# Patient Record
Sex: FEMALE | Race: WHITE | NOT HISPANIC OR LATINO
[De-identification: names, ages, dates, MRNs, and addresses within clinical notes are randomized per-mention and may not be internally consistent; named-entity substitution may affect disease eponyms.]

---

## 2017-01-12 ENCOUNTER — APPOINTMENT (OUTPATIENT)
Dept: BARIATRICS | Facility: CLINIC | Age: 32
End: 2017-01-12

## 2017-01-12 VITALS
TEMPERATURE: 98 F | HEIGHT: 65.5 IN | WEIGHT: 168 LBS | BODY MASS INDEX: 27.65 KG/M2 | HEART RATE: 77 BPM | SYSTOLIC BLOOD PRESSURE: 127 MMHG | DIASTOLIC BLOOD PRESSURE: 82 MMHG

## 2017-04-20 ENCOUNTER — APPOINTMENT (OUTPATIENT)
Dept: BARIATRICS | Facility: CLINIC | Age: 32
End: 2017-04-20

## 2017-04-20 VITALS
HEART RATE: 69 BPM | HEIGHT: 65.5 IN | TEMPERATURE: 97.5 F | OXYGEN SATURATION: 98 % | BODY MASS INDEX: 23.95 KG/M2 | SYSTOLIC BLOOD PRESSURE: 112 MMHG | DIASTOLIC BLOOD PRESSURE: 75 MMHG | WEIGHT: 145.5 LBS

## 2017-07-13 ENCOUNTER — APPOINTMENT (OUTPATIENT)
Dept: BARIATRICS | Facility: CLINIC | Age: 32
End: 2017-07-13

## 2017-07-13 VITALS
DIASTOLIC BLOOD PRESSURE: 71 MMHG | BODY MASS INDEX: 22.88 KG/M2 | WEIGHT: 139 LBS | HEIGHT: 65.5 IN | HEART RATE: 70 BPM | SYSTOLIC BLOOD PRESSURE: 115 MMHG

## 2017-11-16 ENCOUNTER — APPOINTMENT (OUTPATIENT)
Dept: BARIATRICS | Facility: CLINIC | Age: 32
End: 2017-11-16

## 2018-04-26 ENCOUNTER — MESSAGE (OUTPATIENT)
Age: 33
End: 2018-04-26

## 2019-08-14 ENCOUNTER — APPOINTMENT (OUTPATIENT)
Dept: BARIATRICS | Facility: CLINIC | Age: 34
End: 2019-08-14
Payer: SELF-PAY

## 2019-08-14 VITALS
OXYGEN SATURATION: 98 % | DIASTOLIC BLOOD PRESSURE: 80 MMHG | SYSTOLIC BLOOD PRESSURE: 115 MMHG | HEART RATE: 79 BPM | HEIGHT: 65.5 IN | BODY MASS INDEX: 24.9 KG/M2 | WEIGHT: 151.25 LBS

## 2019-08-14 DIAGNOSIS — Z98.84 BARIATRIC SURGERY STATUS: ICD-10-CM

## 2019-08-14 DIAGNOSIS — E66.01 MORBID (SEVERE) OBESITY DUE TO EXCESS CALORIES: ICD-10-CM

## 2019-08-14 DIAGNOSIS — K91.2 POSTSURGICAL MALABSORPTION, NOT ELSEWHERE CLASSIFIED: ICD-10-CM

## 2019-08-14 PROCEDURE — 99213 OFFICE O/P EST LOW 20 MIN: CPT

## 2019-09-24 NOTE — PHYSICAL EXAM
[Obese, well nourished, in no acute distress] : obese, well nourished, in no acute distress [Normal] : affect appropriate [de-identified] : mmm, good color

## 2019-09-24 NOTE — HISTORY OF PRESENT ILLNESS
[de-identified] : 35 yo female about 3 yrs s/p lap vsg, c/o "hypoglycemia,"  states gets palpitations and sweats occasionally.  not related to food intake,. sometimes in the middle of night.  states has been happening since surgery.  not worsening.  she is currently breastfeeding her 10 mo old.  typical day: bread- egg , veggies (lettuce raul, tomatoes)\par lunch- protein veggies, bagel, oil and salt\par snack-nuts, pretzels\par dinner- small portions chicken rice, soup, \par not exercising.  moves bowels daily soft.  not taking vitamins. \par denies vomit, numbness, tingling, weakness, heartburn, abd pain, cp, sob\par \par

## 2019-09-24 NOTE — PLAN
[FreeTextEntry1] : labs asap\par nutrition\par small frequent meals with more protein and fiber, counseled on avoiding simple carbs

## 2019-09-24 NOTE — ASSESSMENT
[FreeTextEntry1] : 35 yo female presents about 3 yrs s/p lap vsg, vss, tolerating po, last labs 2017 with low b12, not taking vitamins counseled extensively on importance, diet is high in carbs , discussed higher quality veggies , dark green leafy, more animal protein, less simple carbs, working with nutrition, discussed keeping a log of the symptoms and how often they are happening, what she ate that day and how long after eating, 
Abdominal Pain, N/V/D